# Patient Record
Sex: FEMALE | Race: WHITE | NOT HISPANIC OR LATINO | URBAN - METROPOLITAN AREA
[De-identification: names, ages, dates, MRNs, and addresses within clinical notes are randomized per-mention and may not be internally consistent; named-entity substitution may affect disease eponyms.]

---

## 2024-08-08 ENCOUNTER — ALLIED HEALTH (OUTPATIENT)
Dept: GENETICS | Facility: CLINIC | Age: 34
End: 2024-08-08
Payer: MEDICAID

## 2024-08-08 VITALS
BODY MASS INDEX: 53.81 KG/M2 | DIASTOLIC BLOOD PRESSURE: 84 MMHG | HEIGHT: 61 IN | SYSTOLIC BLOOD PRESSURE: 124 MMHG | HEART RATE: 82 BPM | WEIGHT: 285 LBS

## 2024-08-08 DIAGNOSIS — Z14.8 CARRIER OF SPINAL MUSCULAR ATROPHY: ICD-10-CM

## 2024-08-08 DIAGNOSIS — Z31.5 ENCOUNTER FOR PROCREATIVE GENETIC COUNSELING: ICD-10-CM

## 2024-08-08 PROCEDURE — GENMD PR GENETICS VISIT (MEDICAID/MEDICARE)

## 2024-08-08 RX ORDER — BUSPIRONE HYDROCHLORIDE 7.5 MG/1
TABLET ORAL
COMMUNITY

## 2024-08-08 RX ORDER — FLUOXETINE HYDROCHLORIDE 40 MG/1
40 CAPSULE ORAL DAILY
COMMUNITY

## 2024-08-08 RX ORDER — ACETAMINOPHEN 500 MG
TABLET ORAL
COMMUNITY

## 2024-08-08 RX ORDER — LANOLIN ALCOHOL/MO/W.PET/CERES
1000 CREAM (GRAM) TOPICAL
COMMUNITY

## 2024-08-08 RX ORDER — LABETALOL 100 MG/1
1 TABLET, FILM COATED ORAL 2 TIMES DAILY
COMMUNITY
Start: 2023-03-03

## 2024-08-08 RX ORDER — ESCITALOPRAM OXALATE 5 MG/1
TABLET ORAL
COMMUNITY

## 2024-08-08 RX ORDER — FAMOTIDINE 40 MG/1
40 TABLET, FILM COATED ORAL NIGHTLY
COMMUNITY

## 2024-08-08 RX ORDER — SPIRONOLACTONE 50 MG/1
50 TABLET, FILM COATED ORAL 2 TIMES DAILY
COMMUNITY

## 2024-08-08 RX ORDER — OMEPRAZOLE 40 MG/1
CAPSULE, DELAYED RELEASE ORAL
COMMUNITY

## 2024-08-08 ASSESSMENT — COLUMBIA-SUICIDE SEVERITY RATING SCALE - C-SSRS
6. HAVE YOU EVER DONE ANYTHING, STARTED TO DO ANYTHING, OR PREPARED TO DO ANYTHING TO END YOUR LIFE?: NO
1. IN THE PAST MONTH, HAVE YOU WISHED YOU WERE DEAD OR WISHED YOU COULD GO TO SLEEP AND NOT WAKE UP?: NO
2. HAVE YOU ACTUALLY HAD ANY THOUGHTS OF KILLING YOURSELF?: NO

## 2024-08-08 ASSESSMENT — PATIENT HEALTH QUESTIONNAIRE - PHQ9
1. LITTLE INTEREST OR PLEASURE IN DOING THINGS: NOT AT ALL
SUM OF ALL RESPONSES TO PHQ9 QUESTIONS 1 AND 2: 0
2. FEELING DOWN, DEPRESSED OR HOPELESS: NOT AT ALL

## 2024-08-08 ASSESSMENT — PAIN SCALES - GENERAL: PAINLEVEL: 0-NO PAIN

## 2024-08-09 NOTE — PROGRESS NOTES
Thank you for the referral of Ms. Melida Collins. she is a 34 y.o.,  female who was seen for genetic counseling with her partner, Gabino, to discuss reproductive options due to having a child with Spinal Muscular Atrophy.    PAST HISTORY:   The patient and partner have a 9 year old daughter together who was diagnosed with spinal muscular atrophy (SMA) around 6 months of age. 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 The couple reports that she was originally diagnosed with SMA type I, before being rediagnosed as SMA type II. She is on risdiplam and overall doing well.     Prior carrier screening:  In 2016, patient had carrier screening for about 200 autosomal recessive and X-linked conditions. This test identified the following:      FAMILY HISTORY  Medical and family histories were reviewed and no undue concerns were apparent:    The remainder of the family history was negative for birth defects, intellectual disability, recurrent pregnancy loss, or recognized inherited conditions.  Consanguinity denied.    REPORTED ETHNICITY/RACE:  Patient:    Patient's partner:     COUNSELING:    The following information was discussed with your patient:    Spinal muscular atrophy (SMA). Due to the couple's daughter's diagnosis of SMA, we did not review clinical features in detail today. We reviewed the autosomal recessive inheritance of SMA. 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 If they are both truly carriers of a SMN1 deletion, there is a 25% chance with each conception to be affected with SMA.  We reviewed that should the couple conceive naturally, there are options for both prenatal and post hayde genetic testing. We briefly reviewed the option of early prenatal diagnosis through chorionic villus sampling or amniocentesis as early as 10 weeks  gestation; this allows for targeted testing for SMA. There is also an option to do a blood test that would determine if there is abnormal placental DNA in a pregnant person's blood stream, specifically for the SMA. Or, all genetic testing can be done at birth.   We discussed that preimplantation genetic testing for monogenic disease (PGT-M) is theoretically available on embryos obtained through in vitro fertilization. Genetic testing and/or DNA samples from other family members may be needed for testing of embryos to occur, however, we discussed how this can sometimes be accomplished with the embryo biopsies, too. Once the couple is ready to proceed, we will submit a testing requisition to the company that performs PGT-M, called [employer], for case review.  We reviewed the difference between next generation sequencing and linkage analysis. Carrier screening, which has already been done on Ms. Collins, analyzed individual genes to find the exact mutation that increases risk for disease. This type of specific gene analysis cannot be done on embryo biopsies, as there is much less DNA in these samples.   Once the couple's case is accepted, the laboratory will creat a specific probe for the known mutations in the couple. A biopsy of the trophectoderm of each embryo obtained from a future IVF cycle will then be sent to [email or web address] for analysis. Consultation with the genetic testing company is needed to discuss details of test design and process further. We discussed that approximately 25% of the embryos are expected to inherit SMA, and approximately 50% are going to be unaffected carriers. PGT-M identifies affected and unaffected embryos with >98% accuracy.   Due to desire for PGT-M, we also briefly discussed preimplantation genetic testing for aneuploidy (PGT-A). This screening allows us to evaluate the number of chromosomes present in an embryo to assess for missing or extra chromosomes, including analysis of the sex  chromosomes. The couple is interested in this testing, too.   Risks of PGT. There is a <5% chance an embryo does not survive the biopsy. Cells are taken from the trophectoderm of the embryo, which is the area that eventually develops into the placenta. Studies have shown that there is no significant difference in implantation rates between those who pursued PGT-A and age-matched individuals who did not, as PGT-A still does not guarantee a chromosomally normal and successful implant (Qi et al 2020).   Per updated ACOG recommendations from 2017, we discussed the availability, benefits, and limitations of carrier screening for cystic fibrosis (CF), spinal muscular atrophy (SMA), and hemoglobinopathies/thalassemias. We reviewed the carrier frequencies of these conditions, varied clinical manifestations, and their autosomal recessive inheritance. The patient has already had negative carrier screening for hemoglobinopathies and thalassemias via CBC and hemoglobin electrophoresis and these results were reviewed.  screening is available for CF, hemoglobinopathies, and thalassemias, and SMA.    We also discussed the availability of more expanded/pan ethnic carrier screening for additional, primarily autosomal recessive, conditions. We discussed the pros and cons of expanded carrier screening including the higher likelihood of being identified as a carrier for at least one condition on a larger panel.  If both members of the couple are found to be carriers for the same condition, there is a 25% chance for an affected child and prenatal diagnosis would be available. Approximately 2-4% of couples are found to be at risk to have a child with a genetic disorder based on this screening. In rare cases, expanded carrier screening results may have health implications for the tested individual.    Ms. Collins previously had carrier screening and these results were discussed today:  Hereditary hemochromatosis. This result  means that Melida is a CARRIER of Hereditary hemochromatosis, but she  is not expected to be affected with this condition. The p.C282Y and p.H63D (the variant that Melida has) variants in the HFE gene are common among individuals of northern  ancestry; approximately 1 in 10 are heterozygous for p.C282Y and 1 in 4 are heterozygous for p.H63D variant. The C282Y and H63D mutations account for over 90% of all individuals with hereditary hemochromatosis. Hemochromatosis is a disorder in which the body absorbs too much iron, which then is stored in the organs. This can cause organ damage in some individuals, leading to symptoms. Symptoms of hemochromatosis include fatigue, joint pain, abdominal pain, lethargy, weight loss, bronze skin pigmentation, diabetes, heart issues, and liver disease. Only a proportion of individuals with two HFE mutations develop any symptoms of the disorder. Some individuals will have high iron levels and one or multiple of the symptoms listed above (known as clinical HH), others will have high iron levels and no symptoms (known as biochemical HH), and some will have neither high iron levels nor symptoms. The chance to develop symptoms with HFE related hemochromatosis depends on which mutations an individual has. Most individuals with HFE related hemochromatosis do not develop any symptoms of the disorder. We discussed that in the general population 1 in 9 individuals are carrier of hemochromatosis and in some populations, the carrier frequency is as high as 1 in 3. Testing is available for the patient's partner to determine if there is a reproductive risk.   Nonsyndromic hearing loss, GJB2-related. This result means that Melida is a CARRIER of nonsyndromic hearing loss, but she  is not expected to be affected with this condition. Based on general population risk, her partner, Gabino, has a 1 in 30 chance of being a carrier of this condition as well, and therefore there is currently a 1 in 120  "risk of having a future affected child. Per the [facility] report, \"the hearing loss is congenital, ranges from mild to severe, and is stable over time, Individuals affected with this type of hearing loss have no other congenital abnormalities. Intellect is normal.\"  The carrier screen report states that Melida has microcytic anemia due to her low MCV value on CBC, which is consistent with more recent CBC values, too. There are many causes of microcytic anemia with normal hemoglobin electrophoresis; however the most common include iron deficiency and alpha thalassemia trait. It does not appear that alpha globin gene status was determined with the prior carrier screening. Melida may have 2 alpha globin gene deletions. In the event of 2 alpha globin gene deletions, these deletions are typically found on opposite chromosomes, or in trans configuration. In other words, a deletion is inherited from each parent. Whether there is only one deletion, or two deletions in trans, the most deletions an individual can pass onto his or her children is one.  Three alpha globin gene deletions are necessary to produce a more clinically significant alpha thalassemia. Four gene deletions are consistent with hydrops fetalis. The clinical features of alpha thalassemia and its inheritance pattern was reviewed with the patient.   Due to the preconception status, we discussed that alpha globin gene status can be determined in Gabino with carrier screening. If he is a carrier of alpha thalassemia, there may be an increased risk for a clinically significant phenotype and Melida can re-test in the future to determine true gene status. If Gabino is NOT a carrier of alpha thalassemia, this may not be necessary. We will review additional testing options in the future if necessary.  Autosomal recessive inheritance. In these cases, both parents must have one nonworking copy of the same gene. Current reproductive risks are described above, however, they " will be modified if Melida's partner has additional screening. If partner is negative for the conditions described above, the risk will be reduced to <<1%. If partner is positive for one or more of the conditions described above, each pregnancy together will have a 25% chance of being affected.  Carrier screening for Melida's partner is available for the entire panel that Melida had initially, or, for just the specific genes she is positive for, to clarify risk for pregnancy. Based on Melida's results alone, there is a 50% chance that each child is a carrier of each condition.  Other family members, Given Melida's result, each of her parents and her siblings are also at a 50% risk to be carriers of each condition described. Should they desire carrier screening for future reproductive purposes, they can reach out to the [facility] for a prenatal or preconception appointment.  If Melida and her partner are carriers of the same genetic condition beyond SMA, options for screening would again include prenatal testing through placental sampling as early as 10 weeks of pregnancy, amniotic fluid sampling as early as 16 weeks of pregnancy, or post  genetic testing once a baby is born. Embryo screening through preimplantation genetic testing can also be done on autosomal recessive conditions that both partners carry. These options will be discussed in further detail if needed depending on the results of testing.   Patient's inquired about timing of IVF with PGT-M. We discussed that in order to establish care with the reproductive endocrinology and infertility clinic, there is typically about a 6-8 month wait time. Once care is established, it may be another 4+ months until an embryo is tested and transferred for pregnancy. The couple will be given a more specific timeline at the time they establish care with an [facility] provider.  All babies born in the [facility] will be screened for a specific set of genetic  conditions, including spinal muscular atrophy, on the new born screen. We reviewed that this is a newer disease on the [place]  Screen panel, and was not available when their daughter was born. This is due to the implementation of Zolgensma, a one time gene therapy treatment that introduces additional copies of the SMN2 gene to the body. This helps improve symptoms of SMA, but is not suspected to cure it completely. This would theoretically be available to any future children with SMA.    DISPOSITION:  The patient stated that she understood the above information, and Ms. Collins's partner Gabino Park ( 1989) elected to proceed with carrier screening for only SMA (SMN1), hemochromatosis (HFE), nonsyndromic hearing loss (GJB2), and alpha thalassemia (HBA1/HBA2). This targeted carrier screen will be sent to [facility] and results will return in 2-3 weeks. I will contact the couple to review and to discuss next steps at that time.    Patient is considering IVF and I therefore encouraged her to schedule a new reproductive endocrinology and infertility appointment, as their wait times are often long. The couple was encouraged to reach back out to me when they decide if they would like to move forward with PGT-M.     Patient also inquired about financial assistance for IVF and PGT-M given the diagnosis of of SMA in their daughter. I will look into options for fertility and PGT grants and send the patient a [employer] message with additional information.     Amalia Guaman [facility] spent 65 minutes with the patient with greater than 50% of the time spent in face to face counseling.     Thank you for allowing us to participate in the care of your patient.  Should you or your patient have any questions, please do not hesitate to contact our office at [phone].    Sincerely,   Amalia Guaman MS, [facility] [place]  Licensed and Certified Genetic Counselor     Reviewed by:  Dr. Sohail Mueller MD  Maternal Fetal Medicine

## 2024-12-03 ENCOUNTER — APPOINTMENT (OUTPATIENT)
Dept: URGENT CARE | Age: 34
End: 2024-12-03
Payer: MEDICAID

## 2025-01-13 ENCOUNTER — APPOINTMENT (OUTPATIENT)
Dept: ENDOCRINOLOGY | Facility: CLINIC | Age: 35
End: 2025-01-13